# Patient Record
Sex: MALE | Race: WHITE | Employment: FULL TIME | URBAN - METROPOLITAN AREA
[De-identification: names, ages, dates, MRNs, and addresses within clinical notes are randomized per-mention and may not be internally consistent; named-entity substitution may affect disease eponyms.]

---

## 2021-09-07 ENCOUNTER — HOSPITAL ENCOUNTER (EMERGENCY)
Age: 51
Discharge: HOME | End: 2021-09-07

## 2021-09-07 ENCOUNTER — APPOINTMENT (OUTPATIENT)
Dept: GENERAL RADIOLOGY | Age: 51
End: 2021-09-07

## 2021-09-07 VITALS
DIASTOLIC BLOOD PRESSURE: 79 MMHG | BODY MASS INDEX: 31.53 KG/M2 | SYSTOLIC BLOOD PRESSURE: 137 MMHG | TEMPERATURE: 97.8 F | OXYGEN SATURATION: 97 % | RESPIRATION RATE: 18 BRPM | WEIGHT: 267 LBS | HEART RATE: 72 BPM | HEIGHT: 77 IN

## 2021-09-07 DIAGNOSIS — S52.044A CLOSED NONDISPLACED FRACTURE OF CORONOID PROCESS OF RIGHT ULNA, INITIAL ENCOUNTER: Primary | ICD-10-CM

## 2021-09-07 PROCEDURE — 29105 APPLICATION LONG ARM SPLINT: CPT

## 2021-09-07 PROCEDURE — 99202 OFFICE O/P NEW SF 15 MIN: CPT

## 2021-09-07 PROCEDURE — 73090 X-RAY EXAM OF FOREARM: CPT

## 2021-09-07 RX ORDER — IBUPROFEN 200 MG
800 TABLET ORAL EVERY 6 HOURS PRN
COMMUNITY

## 2021-09-07 ASSESSMENT — PAIN DESCRIPTION - LOCATION: LOCATION: ARM

## 2021-09-07 ASSESSMENT — PAIN DESCRIPTION - PAIN TYPE: TYPE: ACUTE PAIN

## 2021-09-07 ASSESSMENT — PAIN DESCRIPTION - DESCRIPTORS: DESCRIPTORS: ACHING;THROBBING;TIGHTNESS

## 2021-09-07 ASSESSMENT — PAIN DESCRIPTION - ONSET: ONSET: GRADUAL

## 2021-09-07 ASSESSMENT — PAIN DESCRIPTION - ORIENTATION: ORIENTATION: RIGHT

## 2021-09-07 ASSESSMENT — PAIN DESCRIPTION - PROGRESSION: CLINICAL_PROGRESSION: GRADUALLY WORSENING

## 2021-09-07 ASSESSMENT — PAIN SCALES - GENERAL: PAINLEVEL_OUTOF10: 6

## 2021-09-07 ASSESSMENT — PAIN DESCRIPTION - FREQUENCY: FREQUENCY: CONTINUOUS

## 2021-09-07 NOTE — ED PROVIDER NOTES
[address] [facility]  Department of Emergency Medicine  UC Encounter Note  21   8:19 AM EDT      NAME: Lubna Garces  :  1970  MRN:  [id number]    Chief Complaint: Arm Pain (States he thinks he strained his arm at work. Having pain and tightness in right arm.)      This is a 44-year-old male the presents to urgent care complaining of a right arm pain mostly between his right elbow and right wrist area. 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 He states the pain has not decreased over these last couple days he has been at work. He does state a little bit of tingling in his hands. He thinks he has some tendinitis in his wrist.  He is right-handed. Denies any significant right arm pain in the past.  Denies head neck back chest or other extremity pain. No chest pain. On first contact with patient he appears to be in no acute distress. Review of Systems  Pertinent positives and negatives are stated within HPI, all other systems reviewed and are negative. Physical Exam  Vitals and nursing note reviewed. Constitutional:       Appearance: He is well-developed. HENT:      Head: Normocephalic and atraumatic. Jaw: No trismus. Right Ear: Hearing, tympanic membrane, ear canal and external ear normal.      Left Ear: Hearing, tympanic membrane, ear canal and external ear normal.      Nose: Nose normal.      Right Sinus: No maxillary sinus tenderness or frontal sinus tenderness. Left Sinus: No maxillary sinus tenderness or frontal sinus tenderness. Mouth/Throat:      Pharynx: Uvula midline. No uvula swelling. Eyes:      General: Lids are normal.      Conjunctiva/sclera: Conjunctivae normal.      Pupils: Pupils are equal, round, and reactive to light. Cardiovascular:      Rate and Rhythm: Normal rate and regular rhythm. Heart sounds: Normal heart sounds.  No murmur heard.     Pulmonary:      Effort: Pulmonary effort is normal.      Breath sounds: Normal breath sounds. Abdominal:      General: Bowel sounds are normal.      Palpations: Abdomen is soft. Abdomen is not rigid. Tenderness: There is no abdominal tenderness. There is no guarding or rebound. Musculoskeletal:      Right shoulder: Normal.      Right hand: Normal.      Cervical back: Normal range of motion and neck supple. Comments: Right proximal ulnar forearm tender, Right distal forearm/wrist tender. No cyanosis, redness. Has palpable radial pulse, ROM full but painfull. No open area. Skin:     General: Skin is warm and dry. Findings: No abrasion or rash. Neurological:      Mental Status: He is alert and oriented to person, place, and time. GCS: GCS eye subscore is 4. GCS verbal subscore is 5. GCS motor subscore is 6. Cranial Nerves: Cranial nerves are intact. No cranial nerve deficit. Sensory: Sensation is intact. No sensory deficit. Motor: Motor function is intact. Coordination: Coordination is intact. Coordination normal.      Gait: Gait is intact. Gait normal.         Procedures    MDM  Number of Diagnoses or Management Options  Closed nondisplaced fracture of coronoid process of right ulna, initial encounter  Diagnosis management comments: Fracture possible to the proximal right forearm. In the ulna coronoid process area. Patient is tender in that area. We will put in a splint and use sling until follow-up with specialist.  Light duty until seen with specialist within 1 week. Instructions given. The splint was checked patient is neurovascularly intact.           --------------------------------------------- PAST HISTORY ---------------------------------------------  Past Medical History:  has no past medical history on file. Past Surgical History:  has a past surgical history that includes Appendectomy. Social History:  reports that he has never smoked. His smokeless tobacco use includes chew. Family History: family history is not on file. The patients home medications have been reviewed. Allergies: Patient has no known allergies. -------------------------------------------------- RESULTS -------------------------------------------------  No results found for this visit on 09/07/21. XR RADIUS ULNA RIGHT (2 VIEWS)   Final Result   Ulnar coronoid process fracture which may be subacute or chronic.             ------------------------- NURSING NOTES AND VITALS REVIEWED ---------------------------   The nursing notes within the ED encounter and vital signs as below have been reviewed. /79   Pulse 72   Temp 97.8 °F (36.6 °C) (Infrared)   Resp 18   Ht 6' 5\" (1.956 m)   Wt 267 lb (121.1 kg)   SpO2 97%   BMI 31.66 kg/m²   Oxygen Saturation Interpretation: Normal      ------------------------------------------ PROGRESS NOTES ------------------------------------------   I have spoken with the patient and discussed todays results, in addition to providing specific details for the plan of care and counseling regarding the diagnosis and prognosis. Their questions are answered at this time and they are agreeable with the plan.      --------------------------------- ADDITIONAL PROVIDER NOTES ---------------------------------     This patient is stable for discharge. I have shared the specific conditions for return, as well as the importance of follow-up. * NOTE: This report was transcribed using voice recognition software. Every effort was made to ensure accuracy; however, inadvertent computerized transcription errors may be present.    --------------------------------- IMPRESSION AND DISPOSITION ---------------------------------    IMPRESSION  1.  Closed nondisplaced fracture of coronoid process of right ulna, initial encounter        DISPOSITION  Disposition: Discharge to home  Patient condition is good       Salas Morse PA-C  09/07/21 [facility]

## 2022-11-29 ENCOUNTER — OFFICE VISIT (OUTPATIENT)
Dept: ORTHOPEDIC SURGERY | Age: 52
End: 2022-11-29

## 2022-11-29 VITALS — HEIGHT: 77 IN | WEIGHT: 267 LBS | BODY MASS INDEX: 31.53 KG/M2

## 2022-11-29 DIAGNOSIS — S06.0X0A CONCUSSION WITHOUT LOSS OF CONSCIOUSNESS, INITIAL ENCOUNTER: Primary | ICD-10-CM

## 2022-11-29 RX ORDER — AMITRIPTYLINE HYDROCHLORIDE 25 MG/1
25 TABLET, FILM COATED ORAL NIGHTLY
Qty: 30 TABLET | Refills: 0 | Status: SHIPPED | OUTPATIENT
Start: 2022-11-29

## 2022-11-29 NOTE — PROGRESS NOTES
[facility]  PRIMARY CARE SPORTS MEDICINE  DATE OF VISIT : 2022    Patient : Jake Randle  Age : 50 y.o.  : 1970  MRN : [id number]   ______________________________________________________________________    Chief Complaint :   Chief Complaint   Patient presents with    Head Injury     2022 Patient states that he hit his head on a jack  and his has been having headaches. HPI : Jake Randle is 50 y.o. male who presented to the clinic today for medical management of recently diagnosed concussion sustained on 2022. Review of Systems    Headache Yes   Nausea  No   Vomiting No   Balance problems Yes   Dizziness Yes   Fatigue Yes   Trouble falling asleep No   Sleeping more than usual Yes   Sleeping less than usual No   Drowsiness Yes   Sensitivity to light Yes   Sensitivity to noise No   Irritability Yes   Sadness No   Nervousness Yes   Feeling more emotional Yes   Numbness or tingling Yes   Feeling slowed down Yes   Feeling mentally foggy Yes   Difficulty concentrating No   Difficulty remembering Yes   Visual problems Yes     Past Medical History :  No past medical history on file. Past Surgical History:   Procedure Laterality Date    APPENDECTOMY         Allergies :   No Known Allergies    Medication List :    Current Outpatient Medications   Medication Sig Dispense Refill    amitriptyline (ELAVIL) 25 MG tablet Take 1 tablet by mouth nightly 30 tablet 0    ibuprofen (ADVIL;MOTRIN) 200 MG tablet Take 800 mg by mouth every 6 hours as needed for Pain       No current facility-administered medications for this visit.      ______________________________________________________________________    Physical Exam :    Vitals: Ht 6' 5\" (1.956 m)   Wt 267 lb (121.1 kg)   BMI 31.66 kg/m²   General Appearance: Nontoxic, awake, alert, and in no acute distress. Chest wall/Lung: Respirations regular and unlabored. No cyanosis. Heart: RRR, distal pulses intact.   Neurologic: Alert&Oriented x3. Sensation grossly intact. No focal motor deficits detected.  ______________________________________________________________________    Assessment & Plan :    1. Concussion without loss of consciousness, initial encounter  Trial of oral Elavil nightly  Encourage patient to keep headache diary to monitor symptoms  Encourage patient to continue with physical therapy as previously ordered by physician of record    - amitriptyline (ELAVIL) 25 MG tablet; Take 1 tablet by mouth nightly  Dispense: 30 tablet; Refill: 0    Return to Office: Return in about 1 month (around 12/29/2022) for re-evaluation.     Bonnie Melo MD

## 2022-12-29 ENCOUNTER — OFFICE VISIT (OUTPATIENT)
Dept: ORTHOPEDIC SURGERY | Age: 52
End: 2022-12-29

## 2022-12-29 VITALS — HEIGHT: 77 IN | BODY MASS INDEX: 31.53 KG/M2 | WEIGHT: 267 LBS

## 2022-12-29 DIAGNOSIS — S06.0X0D CONCUSSION WITHOUT LOSS OF CONSCIOUSNESS, SUBSEQUENT ENCOUNTER: Primary | ICD-10-CM

## 2022-12-29 DIAGNOSIS — S06.0X0A CONCUSSION WITHOUT LOSS OF CONSCIOUSNESS, INITIAL ENCOUNTER: ICD-10-CM

## 2022-12-29 RX ORDER — AMITRIPTYLINE HYDROCHLORIDE 25 MG/1
25 TABLET, FILM COATED ORAL NIGHTLY
Qty: 30 TABLET | Refills: 0 | Status: SHIPPED | OUTPATIENT
Start: 2022-12-29

## 2022-12-29 NOTE — PROGRESS NOTES
[facility]  PRIMARY CARE SPORTS MEDICINE  DATE OF VISIT : 2022    Patient : Marjan Greene  Age : 50 y.o.  : 1970  MRN : [id number]   ______________________________________________________________________    Chief Complaint :   Chief Complaint   Patient presents with    Concussion     Patient states that he is feeling much better, still having some headaches but the are not as severe or as frequent. HPI : Marjan Greene is 50 y.o. male who presented to the clinic today for follow-up of concussion. Patient reports significant improvement of symptoms with oral amitriptyline. Patient denies side effects with medication. Past Medical History :  No past medical history on file. Past Surgical History:   Procedure Laterality Date    APPENDECTOMY         Allergies :   No Known Allergies    Medication List :    Current Outpatient Medications   Medication Sig Dispense Refill    amitriptyline (ELAVIL) 25 MG tablet Take 1 tablet by mouth nightly 30 tablet 0    ibuprofen (ADVIL;MOTRIN) 200 MG tablet Take 800 mg by mouth every 6 hours as needed for Pain       No current facility-administered medications for this visit.      ______________________________________________________________________    Physical Exam :    Vitals: Ht 6' 5\" (1.956 m)   Wt 267 lb (121.1 kg)   BMI 31.66 kg/m²   General Appearance: Nontoxic, awake, alert, and in no acute distress. Chest wall/Lung: Respirations regular and unlabored. No cyanosis. Heart: RRR, distal pulses intact. Neurologic: Alert&Oriented x3. Sensation grossly intact. No focal motor deficits detected.  ______________________________________________________________________    Assessment & Plan :    1. Concussion without loss of consciousness, subsequent encounter  Patient reports significant improvement of symptoms while on amitriptyline. Continue amitriptyline for now, refills electronically prescribed to the pharmacy today.   Consider weaning medication after completion of this prescription. Continue current treatment with other specialists. Return to Office: Return if symptoms worsen or fail to improve.     Sonja Shoemaker MD

## 2023-01-31 DIAGNOSIS — S06.0X0A CONCUSSION WITHOUT LOSS OF CONSCIOUSNESS, INITIAL ENCOUNTER: ICD-10-CM

## 2023-03-20 RX ORDER — AMITRIPTYLINE HYDROCHLORIDE 25 MG/1
25 TABLET, FILM COATED ORAL NIGHTLY
Qty: 30 TABLET | Refills: 0 | OUTPATIENT
Start: 2023-03-20

## 2024-04-01 ENCOUNTER — OFFICE VISIT (OUTPATIENT)
Dept: ORTHOPEDIC SURGERY | Age: 54
End: 2024-04-01
Payer: COMMERCIAL

## 2024-04-01 DIAGNOSIS — S06.0X0D CONCUSSION WITHOUT LOSS OF CONSCIOUSNESS, SUBSEQUENT ENCOUNTER: ICD-10-CM

## 2024-04-01 PROCEDURE — 99213 OFFICE O/P EST LOW 20 MIN: CPT | Performed by: FAMILY MEDICINE

## 2024-04-01 RX ORDER — AMITRIPTYLINE HYDROCHLORIDE 25 MG/1
25 TABLET, FILM COATED ORAL NIGHTLY
Qty: 30 TABLET | Refills: 0 | Status: SHIPPED | OUTPATIENT
Start: 2024-04-01

## 2024-04-01 NOTE — PROGRESS NOTES
[facility]  PRIMARY CARE SPORTS MEDICINE  DATE OF VISIT : 2024    Patient : Dalton Galvan  Age : 54 y.o.   : 1970  MRN : [id number]   ______________________________________________________________________    Chief Complaint :   Chief Complaint   Patient presents with    Follow-up     Concussion   been getting headache in the front face andgo up and shoot across   no longer on Elavil  no other symptoms       HPI : Dalton Galvan is 54 y.o. male who presented to the clinic today for follow-up of concussion.  Patient reports return of symptoms since previous visit without exacerbating event.  Patient endorses significant improvement of symptoms with oral amitriptyline requesting refill.  Patient denies side effects with medication.    Past Medical History :  No past medical history on file.  Past Surgical History:   Procedure Laterality Date    APPENDECTOMY         Allergies :   No Known Allergies    Medication List :    Current Outpatient Medications   Medication Sig Dispense Refill    amitriptyline (ELAVIL) 25 MG tablet Take 1 tablet by mouth nightly 30 tablet 0    ibuprofen (ADVIL;MOTRIN) 200 MG tablet Take 800 mg by mouth every 6 hours as needed for Pain       No current facility-administered medications for this visit.      ______________________________________________________________________    Physical Exam :    Vitals: There were no vitals taken for this visit.  General Appearance: Nontoxic, awake, alert, and in no acute distress.  Chest wall/Lung: Respirations regular and unlabored. No cyanosis.  Heart: RRR, distal pulses intact.  Neurologic: Alert&Oriented x3. Sensation grossly intact. No focal motor deficits detected.  ______________________________________________________________________    Assessment & Plan :    1. Concussion without loss of consciousness, subsequent encounter  Patient reports significant improvement of symptoms while on amitriptyline.  Refill amitriptyline

## 2024-04-27 DIAGNOSIS — S06.0X0D CONCUSSION WITHOUT LOSS OF CONSCIOUSNESS, SUBSEQUENT ENCOUNTER: ICD-10-CM

## 2024-04-29 RX ORDER — AMITRIPTYLINE HYDROCHLORIDE 25 MG/1
25 TABLET, FILM COATED ORAL NIGHTLY
Qty: 30 TABLET | Refills: 0 | Status: SHIPPED | OUTPATIENT
Start: 2024-04-29

## 2024-06-04 DIAGNOSIS — S06.0X0D CONCUSSION WITHOUT LOSS OF CONSCIOUSNESS, SUBSEQUENT ENCOUNTER: ICD-10-CM

## 2024-06-05 RX ORDER — AMITRIPTYLINE HYDROCHLORIDE 25 MG/1
25 TABLET, FILM COATED ORAL NIGHTLY
Qty: 30 TABLET | Refills: 0 | Status: SHIPPED | OUTPATIENT
Start: 2024-06-05

## 2024-07-05 DIAGNOSIS — S06.0X0D CONCUSSION WITHOUT LOSS OF CONSCIOUSNESS, SUBSEQUENT ENCOUNTER: ICD-10-CM

## 2024-07-05 RX ORDER — AMITRIPTYLINE HYDROCHLORIDE 25 MG/1
25 TABLET, FILM COATED ORAL NIGHTLY
Qty: 30 TABLET | Refills: 0 | Status: SHIPPED | OUTPATIENT
Start: 2024-07-05

## 2024-08-09 DIAGNOSIS — S06.0X0D CONCUSSION WITHOUT LOSS OF CONSCIOUSNESS, SUBSEQUENT ENCOUNTER: ICD-10-CM

## 2024-08-12 RX ORDER — AMITRIPTYLINE HYDROCHLORIDE 25 MG/1
25 TABLET, FILM COATED ORAL NIGHTLY
Qty: 30 TABLET | Refills: 0 | Status: SHIPPED | OUTPATIENT
Start: 2024-08-12

## 2024-09-10 DIAGNOSIS — S06.0X0D CONCUSSION WITHOUT LOSS OF CONSCIOUSNESS, SUBSEQUENT ENCOUNTER: ICD-10-CM

## 2024-11-07 DIAGNOSIS — S06.0X0D CONCUSSION WITHOUT LOSS OF CONSCIOUSNESS, SUBSEQUENT ENCOUNTER: ICD-10-CM

## 2024-12-12 DIAGNOSIS — S06.0X0D CONCUSSION WITHOUT LOSS OF CONSCIOUSNESS, SUBSEQUENT ENCOUNTER: ICD-10-CM
